# Patient Record
Sex: FEMALE | Race: WHITE | NOT HISPANIC OR LATINO | URBAN - NONMETROPOLITAN AREA
[De-identification: names, ages, dates, MRNs, and addresses within clinical notes are randomized per-mention and may not be internally consistent; named-entity substitution may affect disease eponyms.]

---

## 2019-07-02 NOTE — PATIENT DISCUSSION
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 - Plan : Refer to [address] [place] [address],[address] [facility] for evaluation.

## 2019-07-02 NOTE — PATIENT DISCUSSION
(D36.913) Dermatochalasis of right upper eyelid - Assesment : Examination revealed Dermatochalasis - Plan : Monitor for changes. Advised patient to call our office with decreased vision or increased symptoms.

## 2019-07-02 NOTE — PATIENT DISCUSSION
(G25.724) Dermatochalasis of left upper eyelid - Assesment : Examination revealed Dermatochalasis - Plan : Monitor for changes. Advised patient to call our office with decreased vision or increased symptoms.

## 2019-07-02 NOTE — PATIENT DISCUSSION
(H25.042) Posterior subcapsular polar age-related cataract, left eye - Assesment : Examination revealed Posterior Subcapsular Polar Senile Cataract. Moderate OS - Plan : Monitor for changes. Advised patient to call our office with decreased vision or increased symptoms. Glasses Rx updated and given.    RTC 1 year/EXAM

## 2019-07-02 NOTE — PATIENT DISCUSSION
(H25.013) Cortical age-related cataract, bilateral - Assesment : Examination revealed Cortical Senile Cataract. Moderate OU, OS>OD - Plan : Risks, Benefits and Alternatives were discussed with patient at length for Cataract Surgery. Visual symptoms are consistent with Cataract findings on examination and current refraction no longer provides satisfactory vision. Patient does mind  wearing glasses. Limited focus (monofocal) IOL was discussed advising that glasses or contact lenses would still be needed for best vision, no intraoperative abberometry or treatment of astigmatism or presbyopia is included. Patient understands and desires surgery. All questions answered. Risks, Benefits and Alternatives discussed at length for IOL placement. Patient will need to wear glasses for best corrected vision at distance and near.    EYE: OS IOL TYPE: Limited Focus POST OPERATIVE TARGET: [facility] RECOMMENDED PACKAGE:  None PT PREFERRED PACKAGE:  TBD OD to follow

## 2020-01-07 ENCOUNTER — IMPORTED ENCOUNTER (OUTPATIENT)
Dept: URBAN - NONMETROPOLITAN AREA CLINIC 1 | Facility: CLINIC | Age: 70
End: 2020-01-07

## 2020-01-07 PROBLEM — H52.03: Noted: 2020-01-07

## 2020-01-07 PROBLEM — H25.13: Noted: 2020-01-07

## 2020-01-07 PROBLEM — H25.11: Noted: 2020-01-07

## 2020-01-07 PROBLEM — H25.12: Noted: 2020-01-07

## 2020-01-07 PROBLEM — E11.9: Noted: 2020-01-07

## 2020-01-07 PROBLEM — H52.223: Noted: 2020-01-07

## 2020-01-07 PROBLEM — H52.4: Noted: 2020-01-07

## 2020-01-07 PROCEDURE — S0620 ROUTINE OPHTHALMOLOGICAL EXA: HCPCS

## 2021-08-02 ENCOUNTER — IMPORTED ENCOUNTER (OUTPATIENT)
Dept: URBAN - NONMETROPOLITAN AREA CLINIC 1 | Facility: CLINIC | Age: 71
End: 2021-08-02

## 2021-08-02 PROCEDURE — 92014 COMPRE OPH EXAM EST PT 1/>: CPT

## 2021-08-02 PROCEDURE — 92015 DETERMINE REFRACTIVE STATE: CPT

## 2021-08-02 NOTE — PATIENT DISCUSSION
DM s -Stressed the importance of keeping blood sugars under control blood pressure under control and weight normalization and regular visits with PCP. -Explained the possible effects of poorly controlled diabetes and the damage that diabetes can cause to ocular health. -Patient to check HgbA1C.-Pt instructed to contact our office with any vision changes. Cataract OU-Not yet surgical. -Reviewed symptoms of advancing cataract growth such as glare and halos and decreased vision.-Continue to monitor for now. Pt will notify us if any new symptoms develop. Hyperopia-Discussed diagnosis with patient. Updated spec Rx given.   Recommend lens that will provide comfort as well as protect safety and health of eyes.; 's Notes: MR 8/2/2021DFE 8/2/2021

## 2022-04-09 ASSESSMENT — TONOMETRY
OD_IOP_MMHG: 16
OS_IOP_MMHG: 16
OS_IOP_MMHG: 16
OD_IOP_MMHG: 15

## 2022-04-09 ASSESSMENT — VISUAL ACUITY
OS_SC: 20/40
OD_SC: 20/40
OS_SC: 20/30
OS_GLARE: 20/40
OU_CC: J1
OD_SC: 20/30
OU_CC: 20/30

## 2023-01-24 ENCOUNTER — ESTABLISHED PATIENT (OUTPATIENT)
Dept: RURAL CLINIC 1 | Facility: CLINIC | Age: 73
End: 2023-01-24

## 2023-01-24 DIAGNOSIS — H52.03: ICD-10-CM

## 2023-01-24 DIAGNOSIS — H25.13: ICD-10-CM

## 2023-01-24 DIAGNOSIS — E11.9: ICD-10-CM

## 2023-01-24 PROCEDURE — 92015 DETERMINE REFRACTIVE STATE: CPT

## 2023-01-24 PROCEDURE — 92014 COMPRE OPH EXAM EST PT 1/>: CPT

## 2023-01-24 ASSESSMENT — VISUAL ACUITY
OU_CC: 20/30
OS_CC: 20/50
OS_PH: 20/30-2
OD_PH: 20/40
OU_CC: 20/50
OD_CC: 20/50

## 2023-01-24 ASSESSMENT — TONOMETRY
OD_IOP_MMHG: 13
OS_IOP_MMHG: 14

## 2023-04-27 ENCOUNTER — PRE-OP/H&P (OUTPATIENT)
Dept: RURAL CLINIC 1 | Facility: CLINIC | Age: 73
End: 2023-04-27

## 2023-04-27 VITALS
HEIGHT: 61 IN | HEART RATE: 75 BPM | SYSTOLIC BLOOD PRESSURE: 123 MMHG | BODY MASS INDEX: 32.47 KG/M2 | WEIGHT: 172 LBS | DIASTOLIC BLOOD PRESSURE: 79 MMHG

## 2023-04-27 DIAGNOSIS — H25.13: ICD-10-CM

## 2023-04-27 DIAGNOSIS — H25.043: ICD-10-CM

## 2023-04-27 DIAGNOSIS — Z01.818: ICD-10-CM

## 2023-04-27 PROCEDURE — 99499 UNLISTED E&M SERVICE: CPT

## 2023-04-27 ASSESSMENT — KERATOMETRY
OS_K2POWER_DIOPTERS: 44.75
OD_K1POWER_DIOPTERS: 43.50
OS_AXISANGLE_DEGREES: 180
OD_AXISANGLE_DEGREES: 180
OS_AXISANGLE2_DEGREES: 090
OD_K2POWER_DIOPTERS: 45.00
OS_K1POWER_DIOPTERS: 43.50
OD_AXISANGLE2_DEGREES: 090

## 2023-05-16 ENCOUNTER — SURGERY/PROCEDURE (OUTPATIENT)
Dept: URBAN - METROPOLITAN AREA SURGERY 3 | Facility: SURGERY | Age: 73
End: 2023-05-16

## 2023-05-16 DIAGNOSIS — H25.11: ICD-10-CM

## 2023-05-16 PROCEDURE — 66984CV REMOVE CATARACT, INSERT LENS, CUSTOM VISION

## 2023-05-16 PROCEDURE — 66999LNX LENSX

## 2023-05-16 PROCEDURE — 68841 INSJ RX ELUT IMPLT LAC CANAL: CPT

## 2023-05-16 PROCEDURE — 99199PCV PROF CUSTOM VISION PACKAGE

## 2023-05-17 ENCOUNTER — POST-OP (OUTPATIENT)
Dept: RURAL CLINIC 1 | Facility: CLINIC | Age: 73
End: 2023-05-17

## 2023-05-17 DIAGNOSIS — Z96.1: ICD-10-CM

## 2023-05-17 PROCEDURE — 99024 POSTOP FOLLOW-UP VISIT: CPT

## 2023-05-17 ASSESSMENT — KERATOMETRY
OS_AXISANGLE2_DEGREES: 090
OD_K2POWER_DIOPTERS: 45.00
OD_AXISANGLE_DEGREES: 180
OS_AXISANGLE_DEGREES: 180
OS_K1POWER_DIOPTERS: 43.50
OD_K1POWER_DIOPTERS: 43.50
OD_AXISANGLE2_DEGREES: 090
OS_K2POWER_DIOPTERS: 44.75

## 2023-05-17 ASSESSMENT — VISUAL ACUITY
OS_PH: 20/30+2
OS_SC: 20/50-2
OD_SC: 20/25+1

## 2023-05-17 ASSESSMENT — TONOMETRY
OD_IOP_MMHG: 17
OS_IOP_MMHG: 14

## 2023-05-22 ENCOUNTER — POST OP/EVAL OF SECOND EYE (OUTPATIENT)
Dept: RURAL CLINIC 1 | Facility: CLINIC | Age: 73
End: 2023-05-22

## 2023-05-22 VITALS
HEIGHT: 62 IN | BODY MASS INDEX: 31.65 KG/M2 | HEART RATE: 66 BPM | DIASTOLIC BLOOD PRESSURE: 78 MMHG | SYSTOLIC BLOOD PRESSURE: 131 MMHG | WEIGHT: 172 LBS

## 2023-05-22 DIAGNOSIS — Z01.818: ICD-10-CM

## 2023-05-22 DIAGNOSIS — H25.12: ICD-10-CM

## 2023-05-22 DIAGNOSIS — Z96.1: ICD-10-CM

## 2023-05-22 PROCEDURE — 99024 POSTOP FOLLOW-UP VISIT: CPT

## 2023-05-22 ASSESSMENT — KERATOMETRY
OS_K1POWER_DIOPTERS: 43.50
OS_K2POWER_DIOPTERS: 44.75
OS_AXISANGLE_DEGREES: 180
OD_AXISANGLE2_DEGREES: 090
OS_AXISANGLE2_DEGREES: 090
OD_K2POWER_DIOPTERS: 45.00
OD_K1POWER_DIOPTERS: 43.50
OD_AXISANGLE_DEGREES: 180
OD_AXISANGLE_DEGREES: 180
OD_AXISANGLE2_DEGREES: 090
OS_AXISANGLE2_DEGREES: 090
OD_K2POWER_DIOPTERS: 45.00
OS_AXISANGLE_DEGREES: 180
OD_K1POWER_DIOPTERS: 43.50
OS_K1POWER_DIOPTERS: 43.50
OS_K2POWER_DIOPTERS: 44.75

## 2023-05-22 ASSESSMENT — VISUAL ACUITY
OS_BAT: 20/70
OD_SC: 20/30-2
OS_SC: 20/50

## 2023-05-22 ASSESSMENT — TONOMETRY
OD_IOP_MMHG: 19
OS_IOP_MMHG: 19

## 2023-05-31 ENCOUNTER — POST-OP (OUTPATIENT)
Dept: RURAL CLINIC 2 | Facility: CLINIC | Age: 73
End: 2023-05-31

## 2023-05-31 DIAGNOSIS — Z96.1: ICD-10-CM

## 2023-05-31 PROCEDURE — 99024 POSTOP FOLLOW-UP VISIT: CPT

## 2023-05-31 ASSESSMENT — VISUAL ACUITY
OS_PH: 20/40+
OD_SC: 20/25
OS_SC: 20/70

## 2023-05-31 ASSESSMENT — TONOMETRY
OS_IOP_MMHG: 28
OD_IOP_MMHG: 13

## 2023-06-05 ENCOUNTER — POST-OP (OUTPATIENT)
Dept: RURAL CLINIC 1 | Facility: CLINIC | Age: 73
End: 2023-06-05

## 2023-06-05 DIAGNOSIS — Z96.1: ICD-10-CM

## 2023-06-05 PROCEDURE — 99024 POSTOP FOLLOW-UP VISIT: CPT

## 2023-06-05 ASSESSMENT — TONOMETRY
OS_IOP_MMHG: 19
OD_IOP_MMHG: 19

## 2023-06-05 ASSESSMENT — VISUAL ACUITY
OS_SC: 20/50
OS_SC: 20/30-1
OU_SC: 20/50
OD_SC: 20/30-1
OU_SC: 20/25
OD_SC: 20/50